# Patient Record
Sex: MALE
[De-identification: names, ages, dates, MRNs, and addresses within clinical notes are randomized per-mention and may not be internally consistent; named-entity substitution may affect disease eponyms.]

---

## 2023-05-30 ENCOUNTER — NURSE TRIAGE (OUTPATIENT)
Dept: OTHER | Facility: CLINIC | Age: 43
End: 2023-05-30

## 2023-05-30 NOTE — TELEPHONE ENCOUNTER
Location of patient: [place]     CALL: JUAN [id number]    Subjective: Caller states \"infection in eyes\"     Current Symptoms:  Both eyes burn and hurt a lot   Unable to open - no vision changes  Drainage   Redness  Itching   Eye lids are inflamed     Onset:   Yesterday     Pain Severity:   Both eyes - 8/10    Temperature:    none    What has been tried:  Eye drops OTC - no relief    Recommended disposition:   Office today - [facility]/ED if unable to get appt   Pt does not appear to have a PCP   RN offered to assist in finding [address] in network near him - using  we found a [address] clinic near the pt. Care advice provided, patient verbalizes understanding; denies any other questions or concerns; instructed to call back for any new or worsening symptoms. This triage is a result of a call to [address] [place] [address]. Please do not respond to the triage nurse through this encounter. Any subsequent communication should be directly with the patient.     Reason for Disposition   Eye pain present > 24 hours    Protocols used: Eye Pain and Other Symptoms-ADULT-[place]

## 2023-05-30 NOTE — TELEPHONE ENCOUNTER
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     Utilizing [facility] [place], pt provided with [facility] information (address and phone number) and [facility]. Pt denies further questions or needs at this time. This call is a result of a call to [address] [place] [address]. Please do not respond to the triage nurse through this encounter. Any subsequent communication should be directly with the patient. Reason for Disposition   General information question, no triage required and triager able to answer question    Protocols used:  Information Only Call - No Triage-ADULT-